# Patient Record
Sex: FEMALE | Race: WHITE | NOT HISPANIC OR LATINO | Employment: FULL TIME | URBAN - METROPOLITAN AREA
[De-identification: names, ages, dates, MRNs, and addresses within clinical notes are randomized per-mention and may not be internally consistent; named-entity substitution may affect disease eponyms.]

---

## 2017-06-06 ENCOUNTER — TRANSCRIBE ORDERS (OUTPATIENT)
Dept: LAB | Facility: CLINIC | Age: 57
End: 2017-06-06

## 2017-06-06 ENCOUNTER — APPOINTMENT (OUTPATIENT)
Dept: LAB | Facility: CLINIC | Age: 57
End: 2017-06-06
Payer: COMMERCIAL

## 2017-06-06 DIAGNOSIS — E89.0 POSTSURGICAL HYPOTHYROIDISM: Primary | ICD-10-CM

## 2017-06-06 LAB — VENIPUNCTURE: NORMAL

## 2017-06-06 PROCEDURE — 36415 COLL VENOUS BLD VENIPUNCTURE: CPT | Performed by: INTERNAL MEDICINE

## 2017-07-12 ENCOUNTER — APPOINTMENT (EMERGENCY)
Dept: RADIOLOGY | Facility: HOSPITAL | Age: 57
End: 2017-07-12
Payer: COMMERCIAL

## 2017-07-12 ENCOUNTER — HOSPITAL ENCOUNTER (EMERGENCY)
Facility: HOSPITAL | Age: 57
Discharge: OTHER INSTITUTION | End: 2017-07-13
Attending: EMERGENCY MEDICINE | Admitting: EMERGENCY MEDICINE
Payer: COMMERCIAL

## 2017-07-12 VITALS
HEART RATE: 97 BPM | WEIGHT: 139 LBS | RESPIRATION RATE: 18 BRPM | DIASTOLIC BLOOD PRESSURE: 64 MMHG | SYSTOLIC BLOOD PRESSURE: 110 MMHG | TEMPERATURE: 97.1 F | BODY MASS INDEX: 22.34 KG/M2 | OXYGEN SATURATION: 97 % | HEIGHT: 66 IN

## 2017-07-12 DIAGNOSIS — Z72.89 SELF-DESTRUCTIVE BEHAVIOR: ICD-10-CM

## 2017-07-12 DIAGNOSIS — F10.10 ALCOHOL ABUSE: Primary | ICD-10-CM

## 2017-07-12 LAB
AMPHETAMINES SERPL QL SCN: NEGATIVE
ANION GAP SERPL CALCULATED.3IONS-SCNC: 7 MMOL/L (ref 4–13)
BARBITURATES UR QL: NEGATIVE
BASOPHILS # BLD AUTO: 0.1 THOUSANDS/ΜL (ref 0–0.1)
BASOPHILS NFR BLD AUTO: 2 % (ref 0–1)
BENZODIAZ UR QL: NEGATIVE
BILIRUB UR QL STRIP: NEGATIVE
BUN SERPL-MCNC: 7 MG/DL (ref 5–25)
CALCIUM SERPL-MCNC: 8.8 MG/DL (ref 8.3–10.1)
CHLORIDE SERPL-SCNC: 106 MMOL/L (ref 100–108)
CLARITY UR: CLEAR
CO2 SERPL-SCNC: 32 MMOL/L (ref 21–32)
COCAINE UR QL: NEGATIVE
COLOR UR: NORMAL
CREAT SERPL-MCNC: 0.82 MG/DL (ref 0.6–1.3)
EOSINOPHIL # BLD AUTO: 0.1 THOUSAND/ΜL (ref 0–0.61)
EOSINOPHIL NFR BLD AUTO: 2 % (ref 0–6)
ERYTHROCYTE [DISTWIDTH] IN BLOOD BY AUTOMATED COUNT: 13.5 % (ref 11.6–15.1)
ETHANOL SERPL-MCNC: 200 MG/DL (ref 0–3)
GFR SERPL CREATININE-BSD FRML MDRD: >60 ML/MIN/1.73SQ M
GLUCOSE SERPL-MCNC: 96 MG/DL (ref 65–140)
GLUCOSE UR STRIP-MCNC: NEGATIVE MG/DL
HCG UR QL: NORMAL
HCT VFR BLD AUTO: 39.9 % (ref 37–47)
HGB BLD-MCNC: 13.7 G/DL (ref 12–16)
HGB UR QL STRIP.AUTO: NEGATIVE
KETONES UR STRIP-MCNC: NEGATIVE MG/DL
LEUKOCYTE ESTERASE UR QL STRIP: NEGATIVE
LYMPHOCYTES # BLD AUTO: 1.9 THOUSANDS/ΜL (ref 0.6–4.47)
LYMPHOCYTES NFR BLD AUTO: 48 % (ref 14–44)
MCH RBC QN AUTO: 34.5 PG (ref 27–31)
MCHC RBC AUTO-ENTMCNC: 34.4 G/DL (ref 31.4–37.4)
MCV RBC AUTO: 101 FL (ref 82–98)
METHADONE UR QL: NEGATIVE
MONOCYTES # BLD AUTO: 0.5 THOUSAND/ΜL (ref 0.17–1.22)
MONOCYTES NFR BLD AUTO: 12 % (ref 4–12)
NEUTROPHILS # BLD AUTO: 1.4 THOUSANDS/ΜL (ref 1.85–7.62)
NEUTS SEG NFR BLD AUTO: 36 % (ref 43–75)
NITRITE UR QL STRIP: NEGATIVE
NRBC BLD AUTO-RTO: 0 /100 WBCS
OPIATES UR QL SCN: NEGATIVE
PCP UR QL: NEGATIVE
PH UR STRIP.AUTO: 6 [PH] (ref 5–9)
PLATELET # BLD AUTO: 220 THOUSANDS/UL (ref 130–400)
PMV BLD AUTO: 8.1 FL (ref 8.9–12.7)
POTASSIUM SERPL-SCNC: 3.8 MMOL/L (ref 3.5–5.3)
PROT UR STRIP-MCNC: NEGATIVE MG/DL
RBC # BLD AUTO: 3.97 MILLION/UL (ref 4.2–5.4)
SODIUM SERPL-SCNC: 145 MMOL/L (ref 136–145)
SP GR UR STRIP.AUTO: <=1.005 (ref 1–1.03)
THC UR QL: NEGATIVE
TSH SERPL DL<=0.05 MIU/L-ACNC: 5.69 UIU/ML (ref 0.36–3.74)
UROBILINOGEN UR QL STRIP.AUTO: 0.2 E.U./DL
WBC # BLD AUTO: 3.8 THOUSAND/UL (ref 4.8–10.8)

## 2017-07-12 PROCEDURE — 71010 HB CHEST X-RAY 1 VIEW FRONTAL (PORTABLE): CPT

## 2017-07-12 PROCEDURE — 70450 CT HEAD/BRAIN W/O DYE: CPT

## 2017-07-12 PROCEDURE — 80307 DRUG TEST PRSMV CHEM ANLYZR: CPT | Performed by: EMERGENCY MEDICINE

## 2017-07-12 PROCEDURE — 93005 ELECTROCARDIOGRAM TRACING: CPT | Performed by: EMERGENCY MEDICINE

## 2017-07-12 PROCEDURE — 36415 COLL VENOUS BLD VENIPUNCTURE: CPT | Performed by: EMERGENCY MEDICINE

## 2017-07-12 PROCEDURE — 81025 URINE PREGNANCY TEST: CPT | Performed by: EMERGENCY MEDICINE

## 2017-07-12 PROCEDURE — 80048 BASIC METABOLIC PNL TOTAL CA: CPT | Performed by: EMERGENCY MEDICINE

## 2017-07-12 PROCEDURE — 85025 COMPLETE CBC W/AUTO DIFF WBC: CPT | Performed by: EMERGENCY MEDICINE

## 2017-07-12 PROCEDURE — 81003 URINALYSIS AUTO W/O SCOPE: CPT | Performed by: EMERGENCY MEDICINE

## 2017-07-12 PROCEDURE — 84443 ASSAY THYROID STIM HORMONE: CPT | Performed by: EMERGENCY MEDICINE

## 2017-07-12 PROCEDURE — 84439 ASSAY OF FREE THYROXINE: CPT | Performed by: EMERGENCY MEDICINE

## 2017-07-12 PROCEDURE — 80320 DRUG SCREEN QUANTALCOHOLS: CPT | Performed by: EMERGENCY MEDICINE

## 2017-07-12 RX ORDER — LORAZEPAM 1 MG/1
1 TABLET ORAL ONCE
Status: COMPLETED | OUTPATIENT
Start: 2017-07-12 | End: 2017-07-12

## 2017-07-12 RX ORDER — LEVOTHYROXINE SODIUM 0.1 MG/1
88 TABLET ORAL DAILY
Status: ON HOLD | COMMUNITY
End: 2018-10-21

## 2017-07-12 RX ADMIN — LORAZEPAM 1 MG: 1 TABLET ORAL at 21:49

## 2017-07-13 LAB — T4 FREE SERPL-MCNC: 1.04 NG/DL (ref 0.76–1.46)

## 2017-07-13 PROCEDURE — 99285 EMERGENCY DEPT VISIT HI MDM: CPT

## 2017-07-13 RX ORDER — LEVOTHYROXINE SODIUM 88 UG/1
88 TABLET ORAL
Status: DISCONTINUED | OUTPATIENT
Start: 2017-07-13 | End: 2017-07-13 | Stop reason: HOSPADM

## 2017-07-13 RX ORDER — LORAZEPAM 1 MG/1
1 TABLET ORAL EVERY 6 HOURS PRN
Status: DISCONTINUED | OUTPATIENT
Start: 2017-07-13 | End: 2017-07-13 | Stop reason: HOSPADM

## 2017-07-13 RX ORDER — LORAZEPAM 1 MG/1
1 TABLET ORAL EVERY 6 HOURS PRN
Status: DISCONTINUED | OUTPATIENT
Start: 2017-07-13 | End: 2017-07-13

## 2017-07-13 RX ADMIN — LORAZEPAM 1 MG: 1 TABLET ORAL at 09:59

## 2017-07-13 RX ADMIN — LEVOTHYROXINE SODIUM 88 MCG: 88 TABLET ORAL at 10:29

## 2017-07-14 LAB
ATRIAL RATE: 80 BPM
P AXIS: 73 DEGREES
PR INTERVAL: 156 MS
QRS AXIS: 39 DEGREES
QRSD INTERVAL: 78 MS
QT INTERVAL: 380 MS
QTC INTERVAL: 438 MS
T WAVE AXIS: 60 DEGREES
VENTRICULAR RATE: 80 BPM

## 2018-10-17 ENCOUNTER — APPOINTMENT (EMERGENCY)
Dept: RADIOLOGY | Facility: HOSPITAL | Age: 58
DRG: 683 | End: 2018-10-17
Payer: COMMERCIAL

## 2018-10-17 ENCOUNTER — HOSPITAL ENCOUNTER (INPATIENT)
Facility: HOSPITAL | Age: 58
LOS: 4 days | Discharge: HOME | DRG: 683 | End: 2018-10-21
Attending: EMERGENCY MEDICINE | Admitting: FAMILY MEDICINE
Payer: COMMERCIAL

## 2018-10-17 DIAGNOSIS — E03.9 HYPOTHYROIDISM, UNSPECIFIED TYPE: ICD-10-CM

## 2018-10-17 DIAGNOSIS — R79.89 ABNORMAL LFTS: ICD-10-CM

## 2018-10-17 DIAGNOSIS — F10.10 ALCOHOL ABUSE: ICD-10-CM

## 2018-10-17 DIAGNOSIS — N39.0 UTI (URINARY TRACT INFECTION): ICD-10-CM

## 2018-10-17 DIAGNOSIS — R44.3 HALLUCINATION: Primary | ICD-10-CM

## 2018-10-17 DIAGNOSIS — N17.9 AKI (ACUTE KIDNEY INJURY) (HCC): ICD-10-CM

## 2018-10-17 PROBLEM — E87.6 HYPOKALEMIA: Status: ACTIVE | Noted: 2018-10-17

## 2018-10-17 PROBLEM — D69.6 THROMBOCYTOPENIA (HCC): Status: ACTIVE | Noted: 2018-10-17

## 2018-10-17 PROBLEM — N30.01 ACUTE CYSTITIS WITH HEMATURIA: Status: ACTIVE | Noted: 2018-10-17

## 2018-10-17 PROBLEM — F10.239 ALCOHOL WITHDRAWAL (HCC): Status: ACTIVE | Noted: 2018-10-17

## 2018-10-17 PROBLEM — E87.1 HYPONATREMIA: Status: ACTIVE | Noted: 2018-10-17

## 2018-10-17 PROBLEM — F99 PSYCHIATRIC DISTURBANCE: Status: ACTIVE | Noted: 2018-10-17

## 2018-10-17 LAB
ALBUMIN SERPL BCP-MCNC: 4.7 G/DL (ref 3.5–5)
ALP SERPL-CCNC: 56 U/L (ref 46–116)
ALT SERPL W P-5'-P-CCNC: 579 U/L (ref 12–78)
AMPHETAMINES SERPL QL SCN: NEGATIVE
ANION GAP SERPL CALCULATED.3IONS-SCNC: 12 MMOL/L (ref 4–13)
APAP SERPL-MCNC: <2 UG/ML (ref 10–30)
AST SERPL W P-5'-P-CCNC: 845 U/L (ref 5–45)
BACTERIA UR QL AUTO: ABNORMAL /HPF
BARBITURATES UR QL: NEGATIVE
BASOPHILS # BLD AUTO: 0.07 THOUSANDS/ΜL (ref 0–0.1)
BASOPHILS NFR BLD AUTO: 1 % (ref 0–1)
BENZODIAZ UR QL: NEGATIVE
BILIRUB SERPL-MCNC: 1.2 MG/DL (ref 0.2–1)
BILIRUB UR QL STRIP: ABNORMAL
BUN SERPL-MCNC: 26 MG/DL (ref 5–25)
CALCIUM SERPL-MCNC: 9.1 MG/DL (ref 8.3–10.1)
CHLORIDE SERPL-SCNC: 92 MMOL/L (ref 100–108)
CLARITY UR: ABNORMAL
CO2 SERPL-SCNC: 29 MMOL/L (ref 21–32)
COCAINE UR QL: NEGATIVE
COLOR UR: YELLOW
CREAT SERPL-MCNC: 1.59 MG/DL (ref 0.6–1.3)
EOSINOPHIL # BLD AUTO: 0.09 THOUSAND/ΜL (ref 0–0.61)
EOSINOPHIL NFR BLD AUTO: 1 % (ref 0–6)
ERYTHROCYTE [DISTWIDTH] IN BLOOD BY AUTOMATED COUNT: 11.7 % (ref 11.6–15.1)
ETHANOL SERPL-MCNC: <3 MG/DL (ref 0–3)
EXT PREG TEST URINE: NEGATIVE
GFR SERPL CREATININE-BSD FRML MDRD: 36 ML/MIN/1.73SQ M
GLUCOSE SERPL-MCNC: 86 MG/DL (ref 65–140)
GLUCOSE UR STRIP-MCNC: NEGATIVE MG/DL
HCT VFR BLD AUTO: 38.2 % (ref 34.8–46.1)
HGB BLD-MCNC: 12.6 G/DL (ref 11.5–15.4)
HGB UR QL STRIP.AUTO: ABNORMAL
IMM GRANULOCYTES # BLD AUTO: 0.03 THOUSAND/UL (ref 0–0.2)
IMM GRANULOCYTES NFR BLD AUTO: 0 % (ref 0–2)
KETONES UR STRIP-MCNC: ABNORMAL MG/DL
LEUKOCYTE ESTERASE UR QL STRIP: ABNORMAL
LYMPHOCYTES # BLD AUTO: 1.64 THOUSANDS/ΜL (ref 0.6–4.47)
LYMPHOCYTES NFR BLD AUTO: 23 % (ref 14–44)
MCH RBC QN AUTO: 32.7 PG (ref 26.8–34.3)
MCHC RBC AUTO-ENTMCNC: 33 G/DL (ref 31.4–37.4)
MCV RBC AUTO: 99 FL (ref 82–98)
METHADONE UR QL: NEGATIVE
MONOCYTES # BLD AUTO: 0.74 THOUSAND/ΜL (ref 0.17–1.22)
MONOCYTES NFR BLD AUTO: 10 % (ref 4–12)
NEUTROPHILS # BLD AUTO: 4.67 THOUSANDS/ΜL (ref 1.85–7.62)
NEUTS SEG NFR BLD AUTO: 65 % (ref 43–75)
NITRITE UR QL STRIP: NEGATIVE
NON-SQ EPI CELLS URNS QL MICRO: ABNORMAL /HPF
NRBC BLD AUTO-RTO: 0 /100 WBCS
OPIATES UR QL SCN: NEGATIVE
PCP UR QL: NEGATIVE
PH UR STRIP.AUTO: 6 [PH] (ref 5–9)
PLATELET # BLD AUTO: 110 THOUSANDS/UL (ref 149–390)
PMV BLD AUTO: 11.3 FL (ref 8.9–12.7)
POTASSIUM SERPL-SCNC: 3.1 MMOL/L (ref 3.5–5.3)
PROT SERPL-MCNC: 8.4 G/DL (ref 6.4–8.2)
PROT UR STRIP-MCNC: ABNORMAL MG/DL
RBC # BLD AUTO: 3.85 MILLION/UL (ref 3.81–5.12)
RBC #/AREA URNS AUTO: ABNORMAL /HPF
SALICYLATES SERPL-MCNC: 6.3 MG/DL (ref 3–20)
SODIUM SERPL-SCNC: 133 MMOL/L (ref 136–145)
SP GR UR STRIP.AUTO: <=1.005 (ref 1–1.03)
THC UR QL: NEGATIVE
TSH SERPL DL<=0.05 MIU/L-ACNC: 16.9 UIU/ML (ref 0.36–3.74)
UROBILINOGEN UR QL STRIP.AUTO: 0.2 E.U./DL
WBC # BLD AUTO: 7.24 THOUSAND/UL (ref 4.31–10.16)
WBC #/AREA URNS AUTO: ABNORMAL /HPF

## 2018-10-17 PROCEDURE — 96361 HYDRATE IV INFUSION ADD-ON: CPT

## 2018-10-17 PROCEDURE — 84439 ASSAY OF FREE THYROXINE: CPT | Performed by: EMERGENCY MEDICINE

## 2018-10-17 PROCEDURE — 99285 EMERGENCY DEPT VISIT HI MDM: CPT

## 2018-10-17 PROCEDURE — 93005 ELECTROCARDIOGRAM TRACING: CPT

## 2018-10-17 PROCEDURE — 36415 COLL VENOUS BLD VENIPUNCTURE: CPT

## 2018-10-17 PROCEDURE — 94762 N-INVAS EAR/PLS OXIMTRY CONT: CPT

## 2018-10-17 PROCEDURE — 80307 DRUG TEST PRSMV CHEM ANLYZR: CPT

## 2018-10-17 PROCEDURE — 81025 URINE PREGNANCY TEST: CPT

## 2018-10-17 PROCEDURE — 84443 ASSAY THYROID STIM HORMONE: CPT | Performed by: EMERGENCY MEDICINE

## 2018-10-17 PROCEDURE — 96374 THER/PROPH/DIAG INJ IV PUSH: CPT

## 2018-10-17 PROCEDURE — 80329 ANALGESICS NON-OPIOID 1 OR 2: CPT | Performed by: FAMILY MEDICINE

## 2018-10-17 PROCEDURE — 71045 X-RAY EXAM CHEST 1 VIEW: CPT

## 2018-10-17 PROCEDURE — 70450 CT HEAD/BRAIN W/O DYE: CPT

## 2018-10-17 PROCEDURE — 80053 COMPREHEN METABOLIC PANEL: CPT

## 2018-10-17 PROCEDURE — 85025 COMPLETE CBC W/AUTO DIFF WBC: CPT

## 2018-10-17 PROCEDURE — 80320 DRUG SCREEN QUANTALCOHOLS: CPT

## 2018-10-17 PROCEDURE — 87086 URINE CULTURE/COLONY COUNT: CPT

## 2018-10-17 PROCEDURE — 99223 1ST HOSP IP/OBS HIGH 75: CPT | Performed by: FAMILY MEDICINE

## 2018-10-17 PROCEDURE — 81001 URINALYSIS AUTO W/SCOPE: CPT

## 2018-10-17 RX ORDER — ZOLPIDEM TARTRATE 10 MG/1
10 TABLET ORAL
COMMUNITY

## 2018-10-17 RX ORDER — CHLORDIAZEPOXIDE HYDROCHLORIDE 25 MG/1
50 CAPSULE, GELATIN COATED ORAL EVERY 8 HOURS SCHEDULED
Status: DISCONTINUED | OUTPATIENT
Start: 2018-10-17 | End: 2018-10-18

## 2018-10-17 RX ORDER — FOLIC ACID 1 MG/1
1 TABLET ORAL DAILY
Status: DISCONTINUED | OUTPATIENT
Start: 2018-10-17 | End: 2018-10-21 | Stop reason: HOSPADM

## 2018-10-17 RX ORDER — ONDANSETRON 2 MG/ML
4 INJECTION INTRAMUSCULAR; INTRAVENOUS EVERY 6 HOURS PRN
Status: DISCONTINUED | OUTPATIENT
Start: 2018-10-17 | End: 2018-10-21 | Stop reason: HOSPADM

## 2018-10-17 RX ORDER — LORAZEPAM 2 MG/ML
0.5 INJECTION INTRAMUSCULAR ONCE
Status: COMPLETED | OUTPATIENT
Start: 2018-10-17 | End: 2018-10-17

## 2018-10-17 RX ORDER — POTASSIUM CHLORIDE 20 MEQ/1
20 TABLET, EXTENDED RELEASE ORAL ONCE
Status: COMPLETED | OUTPATIENT
Start: 2018-10-17 | End: 2018-10-17

## 2018-10-17 RX ORDER — LORAZEPAM 2 MG/ML
1 INJECTION INTRAMUSCULAR EVERY 4 HOURS PRN
Status: DISCONTINUED | OUTPATIENT
Start: 2018-10-17 | End: 2018-10-21 | Stop reason: HOSPADM

## 2018-10-17 RX ORDER — THIAMINE MONONITRATE (VIT B1) 100 MG
100 TABLET ORAL DAILY
Status: DISCONTINUED | OUTPATIENT
Start: 2018-10-17 | End: 2018-10-21 | Stop reason: HOSPADM

## 2018-10-17 RX ORDER — CLONAZEPAM 1 MG/1
1 TABLET ORAL AS NEEDED
Status: DISCONTINUED | OUTPATIENT
Start: 2018-10-17 | End: 2018-10-17

## 2018-10-17 RX ORDER — CLONAZEPAM 1 MG/1
1 TABLET ORAL AS NEEDED
COMMUNITY

## 2018-10-17 RX ORDER — SODIUM CHLORIDE AND POTASSIUM CHLORIDE .9; .15 G/100ML; G/100ML
125 SOLUTION INTRAVENOUS CONTINUOUS
Status: DISCONTINUED | OUTPATIENT
Start: 2018-10-17 | End: 2018-10-21 | Stop reason: HOSPADM

## 2018-10-17 RX ORDER — LEVOTHYROXINE SODIUM 0.1 MG/1
100 TABLET ORAL
Status: DISCONTINUED | OUTPATIENT
Start: 2018-10-18 | End: 2018-10-18

## 2018-10-17 RX ORDER — LORAZEPAM 2 MG/ML
2 INJECTION INTRAMUSCULAR ONCE
Status: CANCELLED | OUTPATIENT
Start: 2018-10-18

## 2018-10-17 RX ADMIN — SODIUM CHLORIDE 1000 ML: 0.9 INJECTION, SOLUTION INTRAVENOUS at 19:44

## 2018-10-17 RX ADMIN — LORAZEPAM 0.5 MG: 2 INJECTION INTRAMUSCULAR; INTRAVENOUS at 20:36

## 2018-10-17 RX ADMIN — SODIUM CHLORIDE AND POTASSIUM CHLORIDE 125 ML/HR: .9; .15 SOLUTION INTRAVENOUS at 22:26

## 2018-10-17 RX ADMIN — Medication 100 MG: at 22:26

## 2018-10-17 RX ADMIN — CHLORDIAZEPOXIDE HYDROCHLORIDE 50 MG: 25 CAPSULE ORAL at 22:24

## 2018-10-17 RX ADMIN — CEFTRIAXONE 1000 MG: 1 INJECTION, SOLUTION INTRAVENOUS at 20:40

## 2018-10-17 RX ADMIN — FOLIC ACID 1 MG: 1 TABLET ORAL at 22:24

## 2018-10-17 RX ADMIN — POTASSIUM CHLORIDE 20 MEQ: 1500 TABLET, EXTENDED RELEASE ORAL at 20:36

## 2018-10-17 RX ADMIN — POTASSIUM CHLORIDE 20 MEQ: 1500 TABLET, EXTENDED RELEASE ORAL at 22:25

## 2018-10-17 RX ADMIN — Medication 1 TABLET: at 22:24

## 2018-10-17 NOTE — LETTER
[address] [facility] [address]  Dept: [phone]    October 21, 2018     Patient: Dean Kaye   YOB: 1960   Date of Visit: 10/17/2018       To Whom it May Concern: Dean Kaye is under my professional care  She was admitted to the hospital from 10/17/2018   to 10/21/18  Please excuse her from work during that time  If you have any questions or concerns, please don't hesitate to call           Sincerely,          Amy Herzog MD

## 2018-10-17 NOTE — ED NOTES
Watch, ring  Pants, bra, shirt, flip flops, purse, various makeup, zyrtec bottle with a different kind of pill in it, 2 phone chargers, eye drops, tin of mints, pen, hair clips, photo id, [employer] explorer card, hair brush, check book, fit bit, gum, wallet, $4, various gift cards, club cards and prayer cards       Emily Aguayo  10/17/18 6125
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
## 2018-10-18 ENCOUNTER — APPOINTMENT (INPATIENT)
Dept: RADIOLOGY | Facility: HOSPITAL | Age: 58
DRG: 683 | End: 2018-10-18
Payer: COMMERCIAL

## 2018-10-18 PROBLEM — F10.231 ALCOHOL WITHDRAWAL SYNDROME, WITH DELIRIUM (HCC): Status: ACTIVE | Noted: 2018-10-17

## 2018-10-18 PROBLEM — N17.9 AKI (ACUTE KIDNEY INJURY) (HCC): Status: RESOLVED | Noted: 2018-10-17 | Resolved: 2018-10-18

## 2018-10-18 PROBLEM — E87.6 HYPOKALEMIA: Status: RESOLVED | Noted: 2018-10-17 | Resolved: 2018-10-18

## 2018-10-18 PROBLEM — E87.1 HYPONATREMIA: Status: RESOLVED | Noted: 2018-10-17 | Resolved: 2018-10-18

## 2018-10-18 LAB
ALBUMIN SERPL BCP-MCNC: 4 G/DL (ref 3.5–5)
ALP SERPL-CCNC: 51 U/L (ref 46–116)
ALT SERPL W P-5'-P-CCNC: 723 U/L (ref 12–78)
ANION GAP SERPL CALCULATED.3IONS-SCNC: 15 MMOL/L (ref 4–13)
AST SERPL W P-5'-P-CCNC: 909 U/L (ref 5–45)
ATRIAL RATE: 96 BPM
BACTERIA UR CULT: NORMAL
BILIRUB SERPL-MCNC: 0.8 MG/DL (ref 0.2–1)
BUN SERPL-MCNC: 27 MG/DL (ref 5–25)
CALCIUM SERPL-MCNC: 8.7 MG/DL (ref 8.3–10.1)
CHLORIDE SERPL-SCNC: 100 MMOL/L (ref 100–108)
CO2 SERPL-SCNC: 23 MMOL/L (ref 21–32)
CREAT SERPL-MCNC: 1.18 MG/DL (ref 0.6–1.3)
ERYTHROCYTE [DISTWIDTH] IN BLOOD BY AUTOMATED COUNT: 11.7 % (ref 11.6–15.1)
GFR SERPL CREATININE-BSD FRML MDRD: 52 ML/MIN/1.73SQ M
GLUCOSE SERPL-MCNC: 87 MG/DL (ref 65–140)
HAV IGM SER QL: NORMAL
HBV CORE IGM SER QL: NORMAL
HBV SURFACE AG SER QL: NORMAL
HCT VFR BLD AUTO: 37.4 % (ref 34.8–46.1)
HCV AB SER QL: NORMAL
HGB BLD-MCNC: 12.1 G/DL (ref 11.5–15.4)
MAGNESIUM SERPL-MCNC: 1.6 MG/DL (ref 1.6–2.6)
MCH RBC QN AUTO: 32.6 PG (ref 26.8–34.3)
MCHC RBC AUTO-ENTMCNC: 32.4 G/DL (ref 31.4–37.4)
MCV RBC AUTO: 101 FL (ref 82–98)
P AXIS: 65 DEGREES
PLATELET # BLD AUTO: 116 THOUSANDS/UL (ref 149–390)
PMV BLD AUTO: 12.2 FL (ref 8.9–12.7)
POTASSIUM SERPL-SCNC: 4.1 MMOL/L (ref 3.5–5.3)
PR INTERVAL: 150 MS
PROT SERPL-MCNC: 7.6 G/DL (ref 6.4–8.2)
QRS AXIS: 49 DEGREES
QRSD INTERVAL: 84 MS
QT INTERVAL: 390 MS
QTC INTERVAL: 492 MS
RBC # BLD AUTO: 3.71 MILLION/UL (ref 3.81–5.12)
SODIUM SERPL-SCNC: 138 MMOL/L (ref 136–145)
T WAVE AXIS: 55 DEGREES
T4 FREE SERPL-MCNC: 1.08 NG/DL (ref 0.76–1.46)
TSH SERPL DL<=0.05 MIU/L-ACNC: 16.62 UIU/ML (ref 0.36–3.74)
VENTRICULAR RATE: 96 BPM
WBC # BLD AUTO: 5.49 THOUSAND/UL (ref 4.31–10.16)

## 2018-10-18 PROCEDURE — 80074 ACUTE HEPATITIS PANEL: CPT | Performed by: FAMILY MEDICINE

## 2018-10-18 PROCEDURE — 85027 COMPLETE CBC AUTOMATED: CPT | Performed by: FAMILY MEDICINE

## 2018-10-18 PROCEDURE — 99254 IP/OBS CNSLTJ NEW/EST MOD 60: CPT | Performed by: PSYCHIATRY & NEUROLOGY

## 2018-10-18 PROCEDURE — 94760 N-INVAS EAR/PLS OXIMETRY 1: CPT

## 2018-10-18 PROCEDURE — 99232 SBSQ HOSP IP/OBS MODERATE 35: CPT | Performed by: STUDENT IN AN ORGANIZED HEALTH CARE EDUCATION/TRAINING PROGRAM

## 2018-10-18 PROCEDURE — 93010 ELECTROCARDIOGRAM REPORT: CPT | Performed by: INTERNAL MEDICINE

## 2018-10-18 PROCEDURE — 94762 N-INVAS EAR/PLS OXIMTRY CONT: CPT

## 2018-10-18 PROCEDURE — 84443 ASSAY THYROID STIM HORMONE: CPT | Performed by: STUDENT IN AN ORGANIZED HEALTH CARE EDUCATION/TRAINING PROGRAM

## 2018-10-18 PROCEDURE — 87081 CULTURE SCREEN ONLY: CPT | Performed by: FAMILY MEDICINE

## 2018-10-18 PROCEDURE — 80053 COMPREHEN METABOLIC PANEL: CPT | Performed by: FAMILY MEDICINE

## 2018-10-18 PROCEDURE — 76705 ECHO EXAM OF ABDOMEN: CPT

## 2018-10-18 PROCEDURE — 83735 ASSAY OF MAGNESIUM: CPT | Performed by: FAMILY MEDICINE

## 2018-10-18 RX ORDER — LORAZEPAM 1 MG/1
2 TABLET ORAL ONCE
Status: COMPLETED | OUTPATIENT
Start: 2018-10-18 | End: 2018-10-18

## 2018-10-18 RX ORDER — LEVOTHYROXINE SODIUM 0.12 MG/1
125 TABLET ORAL
Status: DISCONTINUED | OUTPATIENT
Start: 2018-10-19 | End: 2018-10-21 | Stop reason: HOSPADM

## 2018-10-18 RX ORDER — LORAZEPAM 2 MG/ML
2 INJECTION INTRAMUSCULAR ONCE
Status: COMPLETED | OUTPATIENT
Start: 2018-10-18 | End: 2018-10-18

## 2018-10-18 RX ORDER — LORAZEPAM 2 MG/ML
4 INJECTION INTRAMUSCULAR ONCE
Status: DISCONTINUED | OUTPATIENT
Start: 2018-10-18 | End: 2018-10-18

## 2018-10-18 RX ORDER — CHLORDIAZEPOXIDE HYDROCHLORIDE 25 MG/1
50 CAPSULE, GELATIN COATED ORAL EVERY 6 HOURS SCHEDULED
Status: COMPLETED | OUTPATIENT
Start: 2018-10-19 | End: 2018-10-19

## 2018-10-18 RX ORDER — LORAZEPAM 2 MG/ML
4 INJECTION INTRAMUSCULAR ONCE
Status: COMPLETED | OUTPATIENT
Start: 2018-10-18 | End: 2018-10-18

## 2018-10-18 RX ORDER — LORAZEPAM 1 MG/1
2 TABLET ORAL ONCE
Status: DISCONTINUED | OUTPATIENT
Start: 2018-10-18 | End: 2018-10-18

## 2018-10-18 RX ORDER — DIAZEPAM 5 MG/ML
2.5 INJECTION, SOLUTION INTRAMUSCULAR; INTRAVENOUS ONCE
Status: COMPLETED | OUTPATIENT
Start: 2018-10-18 | End: 2018-10-18

## 2018-10-18 RX ORDER — CHLORDIAZEPOXIDE HYDROCHLORIDE 25 MG/1
50 CAPSULE, GELATIN COATED ORAL EVERY 4 HOURS
Status: COMPLETED | OUTPATIENT
Start: 2018-10-18 | End: 2018-10-19

## 2018-10-18 RX ORDER — CHLORDIAZEPOXIDE HYDROCHLORIDE 25 MG/1
25 CAPSULE, GELATIN COATED ORAL EVERY 6 HOURS SCHEDULED
Status: DISCONTINUED | OUTPATIENT
Start: 2018-10-21 | End: 2018-10-20

## 2018-10-18 RX ORDER — CHLORDIAZEPOXIDE HYDROCHLORIDE 25 MG/1
25 CAPSULE, GELATIN COATED ORAL EVERY 4 HOURS
Status: DISCONTINUED | OUTPATIENT
Start: 2018-10-20 | End: 2018-10-20

## 2018-10-18 RX ADMIN — Medication 2.5 MG: at 06:31

## 2018-10-18 RX ADMIN — LORAZEPAM 2 MG: 2 INJECTION INTRAMUSCULAR; INTRAVENOUS at 06:15

## 2018-10-18 RX ADMIN — CEFTRIAXONE 1000 MG: 1 INJECTION, SOLUTION INTRAVENOUS at 21:12

## 2018-10-18 RX ADMIN — ENOXAPARIN SODIUM 40 MG: 40 INJECTION SUBCUTANEOUS at 08:49

## 2018-10-18 RX ADMIN — CHLORDIAZEPOXIDE HYDROCHLORIDE 50 MG: 25 CAPSULE ORAL at 17:28

## 2018-10-18 RX ADMIN — LORAZEPAM 1 MG: 2 INJECTION INTRAMUSCULAR; INTRAVENOUS at 03:05

## 2018-10-18 RX ADMIN — LORAZEPAM 2 MG: 1 TABLET ORAL at 00:18

## 2018-10-18 RX ADMIN — LORAZEPAM 2 MG: 1 TABLET ORAL at 12:36

## 2018-10-18 RX ADMIN — CHLORDIAZEPOXIDE HYDROCHLORIDE 50 MG: 25 CAPSULE ORAL at 05:16

## 2018-10-18 RX ADMIN — LORAZEPAM 2 MG: 2 INJECTION INTRAMUSCULAR; INTRAVENOUS at 15:08

## 2018-10-18 RX ADMIN — CHLORDIAZEPOXIDE HYDROCHLORIDE 50 MG: 25 CAPSULE ORAL at 21:12

## 2018-10-18 RX ADMIN — LORAZEPAM 4 MG: 2 INJECTION INTRAMUSCULAR; INTRAVENOUS at 08:22

## 2018-10-18 RX ADMIN — LEVOTHYROXINE SODIUM 100 MCG: 100 TABLET ORAL at 05:17

## 2018-10-18 RX ADMIN — Medication 1 TABLET: at 08:48

## 2018-10-18 RX ADMIN — FOLIC ACID 1 MG: 1 TABLET ORAL at 08:48

## 2018-10-18 RX ADMIN — SODIUM CHLORIDE AND POTASSIUM CHLORIDE 125 ML/HR: .9; .15 SOLUTION INTRAVENOUS at 14:38

## 2018-10-18 RX ADMIN — Medication 100 MG: at 08:48

## 2018-10-18 RX ADMIN — SODIUM CHLORIDE AND POTASSIUM CHLORIDE 125 ML/HR: .9; .15 SOLUTION INTRAVENOUS at 23:06

## 2018-10-18 RX ADMIN — SODIUM CHLORIDE AND POTASSIUM CHLORIDE 125 ML/HR: .9; .15 SOLUTION INTRAVENOUS at 06:16

## 2018-10-18 RX ADMIN — LORAZEPAM 2 MG: 2 INJECTION INTRAMUSCULAR; INTRAVENOUS at 10:42

## 2018-10-18 RX ADMIN — CHLORDIAZEPOXIDE HYDROCHLORIDE 50 MG: 25 CAPSULE ORAL at 12:35

## 2018-10-18 RX ADMIN — CHLORDIAZEPOXIDE HYDROCHLORIDE 50 MG: 25 CAPSULE ORAL at 08:48
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
## 2018-10-18 NOTE — ED NOTES
Helio Orr / Irving Boudreaux called - pt's BF reported they were drinking a good bit and went cold turkey about 1 week ago - pt took off work last week due to vomiting (etoh withdrawal?)  ER attending was advised; pt will be medically admitted for withdrawal; Helio Orr / Irving Boudreaux notified

## 2018-10-18 NOTE — ASSESSMENT & PLAN NOTE
· Alcohol withdrawal may be playing a role in this  Patient most likely also has some underlying psychiatric issues  · Place patient on one-to-one observation    Psychiatry consult  · CT head negative for acute intracranial abnormality  · Patient denies any suicidal, homicidal ideation or having any plan in place  · Monitor for any worsening of symptoms  · Will need crisis evaluation prior to discharge

## 2018-10-18 NOTE — ASSESSMENT & PLAN NOTE
· Patient in DTs from alcohol withdrawal  Having hallucinations and is not oriented     · CT head with no acute findings  · UDS negative  · EtOh on admission <3  · On librium and CIWA protocol  · Cont  IVF, multivitamin, thiamine, folic acid  · Monitor and replete electrolytes as needed  · Nonviolent restraints, continuous 1:1 obs as patient is not oriented and is unable to follow commands and interrupting care

## 2018-10-18 NOTE — ASSESSMENT & PLAN NOTE
· No prior lab work available for comparison  May have alcoholic hepatitis  · Tylenol and salicylate level are unremarkable     · UDS negative  · hepatitis panel negative  · RUQ US Echogenic liver most consistent with hepatic steatosis  · IVF hydrating, LFTs peaked and downtrended with IVF  · She was instructed to get blood work to continue to monitor in 1 week and to follow up with her PCP and abstain from EtOH
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
## 2018-10-18 NOTE — PLAN OF CARE
Pt given Ativan 2 mg for CIWA higher that 14  Called hospitalist for one dose of Valium IV 2 5 mg  Pt talking to  and sitter  Tremors notice  Will continued to follow CIWA protocol

## 2018-10-18 NOTE — ASSESSMENT & PLAN NOTE
· Received IV rocephin, 3 days  · UA with blood, leukocytes  · Urine culture with mixed contaminants

## 2018-10-18 NOTE — ASSESSMENT & PLAN NOTE
· Patient's TSH level noted to be elevated at 16 896  · Her Synthroid dose was increased from 100 to 125 mcg daily  · She will need repeat TSH level checked in 4-6 weeks as outpatient

## 2018-10-18 NOTE — PLAN OF CARE
DISCHARGE PLANNING     Discharge to home or other facility with appropriate resources Progressing        INFECTION - ADULT     Absence or prevention of progression during hospitalization Progressing        PAIN - ADULT     Verbalizes/displays adequate comfort level or baseline comfort level Progressing        Potential for Falls     Patient will remain free of falls Progressing        RESPIRATORY - ADULT     Achieves optimal ventilation and oxygenation Progressing        SAFETY,RESTRAINT: NV/NON-SELF DESTRUCTIVE BEHAVIOR     Remains free of harm/injury (restraint for non violent/non self-detsructive behavior) Progressing     Returns to optimal restraint-free functioning Progressing

## 2018-10-18 NOTE — ASSESSMENT & PLAN NOTE
· No prior lab work available for comparison  May have alcoholic hepatitis  · Tylenol and salicylate level are not elevated  · hepatitis panel negative  · RUQ US pending  · IVF hydrating, LFTs still rising  Tbili and alkphos wnl
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
## 2018-10-18 NOTE — ASSESSMENT & PLAN NOTE
· Patient admitted in DTs from alcohol withdrawal with hallucinations/delirium  · CT head with no acute findings  · UDS negative  · EtOh on admission <3  · Cont CIWA protocol  · Cont  IVF, multivitamin, thiamine, folic acid  · Monitor and replete electrolytes as needed  · Initially required Nonviolent restraints, continuous 1:1 obs as she was disoriented and unable to follow commands and interrupting care  Now stable  · Ambien PRN sleep  · Cont supportive care  · Completed librium protocol and was discharged home with resources for outpatient rehab  Patient does not want inpatient rehab

## 2018-10-18 NOTE — PLAN OF CARE
DISCHARGE PLANNING     Discharge to home or other facility with appropriate resources Progressing        INFECTION - ADULT     Absence or prevention of progression during hospitalization Progressing        PAIN - ADULT     Verbalizes/displays adequate comfort level or baseline comfort level Progressing        Potential for Falls     Patient will remain free of falls Progressing        RESPIRATORY - ADULT     Achieves optimal ventilation and oxygenation Progressing

## 2018-10-18 NOTE — RESPIRATORY THERAPY NOTE
Continuous pulse oximetry checked and replaced throughout shift  Patient bites on probe to remove  1:1 in room  I spoke with Dr Boyd Marshall who stated she will discontinue this device  We will leave continuous pulse oximeter in room for patient at this time

## 2018-10-18 NOTE — PROGRESS NOTES
Progress Note - Amanuel Ar 1960, 66 y o  female MRN: [id number]    Unit/Bed#: [address] [place] [address] Encounter: [id number]    Primary Care Provider: Donley Cabot, MD   Date and time admitted to hospital: 10/17/2018  6:08 PM        * Alcohol withdrawal syndrome, with delirium [facility])   Assessment & Plan    · Patient in DTs from alcohol withdrawal  Having hallucinations and is not oriented  · CT head with no acute findings  · UDS negative  · EtOh on admission <3  · On librium and CIWA protocol  · Cont  IVF, multivitamin, thiamine, folic acid  · Monitor and replete electrolytes as needed  · Initially required Nonviolent restraints, continuous 1:1 obs as she was disoriented and unable to follow commands and interrupting care  Now stable     Thrombocytopenia ([address] )   Assessment & Plan    Likely due to alcohol abuse  stable     Abnormal LFTs   Assessment & Plan    · No prior lab work available for comparison  May have alcoholic hepatitis  · Tylenol and salicylate level are unremarkable  · hepatitis panel negative  · RUQ US Echogenic liver most consistent with hepatic steatosis  · IVF hydrating, LFTs have peaked and are downtrending  · cont to monitor with serial labs  Hypothyroidism   Assessment & Plan    · Patient's TSH level noted to be elevated at 16 896  · Her Synthroid dose was increased from 100 to 125 mcg daily  · She will need repeat TSH level checked in 4-6 weeks as outpatient       Acute cystitis with hematuria-resolved as of 10/19/2018   Assessment & Plan    · Received IV rocephin, 3 days  · UA with blood, leukocytes  · Urine culture with mixed contaminants           VTE Pharmacologic Prophylaxis:   Pharmacologic: none  Mechanical VTE Prophylaxis in Place: Yes    Patient Centered Rounds: I have performed bedside rounds with nursing staff today  Discussions with Specialists or Other Care Team Provider: Yes    Education and Discussions with Family / Patient:Yes    Time Spent for Care: 30 minutes  More than 50% of total time spent on counseling and coordination of care as described above  Current Length of Stay: 2 day(s)    Current Patient Status: Inpatient     Discharge Plan: pending    Code Status: Level 1 - Full Code      Subjective:   Feels very shaky and anxious  Denies abdominal pain or N/V  Has not had a BM in several days  Objective:       Vitals:   Temp (24hrs), Av 9 °F (36 6 °C), Min:97 7 °F (36 5 °C), Max:98 1 °F (36 7 °C)    Temp:  [97 7 °F (36 5 °C)-98 1 °F (36 7 °C)] 97 9 °F (36 6 °C)  HR:  [60-86] 60  Resp:  [18] 18  BP: (115-156)/(58-86) 143/84  SpO2:  [95 %-96 %] 96 %  Body mass index is 24 21 kg/m²  Input and Output Summary (last 24 hours): Intake/Output Summary (Last 24 hours) at 10/19/18 1143  Last data filed at 10/19/18 1130   Gross per 24 hour   Intake          4633 33 ml   Output                0 ml   Net          4633 33 ml       Physical Exam:     Physical Exam   Constitutional: She is oriented to person, place, and time  She appears well-developed  No distress  HENT:   Head: Normocephalic and atraumatic  Cardiovascular: Normal rate, regular rhythm and normal heart sounds  Pulmonary/Chest: Effort normal and breath sounds normal  No respiratory distress  She has no wheezes  She has no rales  Abdominal: Soft  Bowel sounds are normal  She exhibits no distension  There is no tenderness  There is no rebound and no guarding  Musculoskeletal: She exhibits no edema, tenderness or deformity  Neurological: She is alert and oriented to person, place, and time  + course tremors   Skin: Skin is warm and dry  She is not diaphoretic  Nursing note and vitals reviewed        Additional Data:     Labs:      Results from last 7 days  Lab Units 10/19/18  0423  10/17/18  1835   WBC Thousand/uL 3 70*  < > 7 24   HEMOGLOBIN g/dL 12 1  < > 12 6   HEMATOCRIT % 38 6  < > 38 2   PLATELETS Thousands/uL 120*  < > 110*   NEUTROS PCT %  --   --  65   LYMPHS PCT %  --   -- 23   MONOS PCT %  --   --  10   EOS PCT %  --   --  1   < > = values in this interval not displayed  Results from last 7 days  Lab Units 10/19/18  0423   SODIUM mmol/L 137   POTASSIUM mmol/L 4 0   CHLORIDE mmol/L 102   CO2 mmol/L 23   BUN mg/dL 13   CREATININE mg/dL 0 82   CALCIUM mg/dL 8 4   ALK PHOS U/L 53   ALT U/L 697*   AST U/L 585*           * I Have Reviewed All Lab Data Listed Above  * Additional Pertinent Lab Tests Reviewed: All Labs For Current Hospital Admission Reviewed    Imaging:  Xr Chest 1 View Portable    Result Date: 10/18/2018  Narrative: CHEST INDICATION:   medical clearance  COMPARISON:  7/12/2017 EXAM PERFORMED/VIEWS:  XR CHEST PORTABLE Images: 1 FINDINGS: Cardiomediastinal silhouette appears unremarkable  Lungs are mildly hyperinflated but clear with no lobar consolidation, interstitial edema, or pleural effusions  No pneumothorax  Curvilinear calcification projecting over the left chest likely represents calcified breast implant  A healed fracture deformity of the right proximal humerus is stable as is suspected osteonecrosis of the head  Impression: No acute cardiopulmonary disease  Workstation performed: [id number]     Ct Head Without Contrast    Result Date: 10/17/2018  Narrative: CT BRAIN - WITHOUT CONTRAST INDICATION:   altered, hallucinations  COMPARISON:  None  TECHNIQUE:  CT examination of the brain was performed  In addition to axial images, coronal 2D reformatted images were created and submitted for interpretation  Radiation dose length product (DLP) for this visit:  1116 44 mGy-cm   This examination, like all CT scans performed in the [facility], was performed utilizing techniques to minimize radiation dose exposure, including the use of iterative reconstruction and automated exposure control  IMAGE QUALITY:  Diagnostic  FINDINGS: PARENCHYMA:  No intracranial mass, mass effect or midline shift  No CT signs of acute infarction    No acute parenchymal hemorrhage  VENTRICLES AND EXTRA-AXIAL SPACES:  Normal for the patient's age  VISUALIZED ORBITS AND PARANASAL SINUSES:  Unremarkable  CALVARIUM AND EXTRACRANIAL SOFT TISSUES:  Normal      Impression: No acute intracranial abnormality  Workstation performed: [id number]     Imaging Reports Reviewed by myself    Cultures:   Blood Culture: No results found for: BLOODCX  Urine Culture:   Lab Results   Component Value Date    URINECX 30,000-39,000 cfu/ml  10/17/2018     Sputum Culture: No components found for: SPUTUMCX  Wound Culture: No results found for: WOUNDCULT    Last 24 Hours Medication List:     Current Facility-Administered Medications:  chlordiazePOXIDE 50 mg Oral Q6H 3630 Ilan Neal MD    Followed by        Harley Daugherty ON 10/20/2018] chlordiazePOXIDE 25 mg Oral Q4H Ludin Fuller MD    Followed by        Harley Daugherty ON 10/21/2018] chlordiazePOXIDE 25 mg Oral Q6H 3630 Ilan Neal MD    enoxaparin 40 mg Subcutaneous Daily Keely Revankar, DO    folic acid 1 mg Oral Daily Keely Revankar, DO    influenza vaccine 0 5 mL Intramuscular Prior to discharge Nemo Ramirez, DO    levothyroxine 125 mcg Oral Early Morning Ludin Fuller MD    LORazepam 1 mg Intravenous Q4H PRN Keely Revankar, DO    multivitamin-minerals 1 tablet Oral Daily Keely Revankar, DO    ondansetron 4 mg Intravenous Q6H PRN Keely Revankar, DO    pneumococcal 23-valent polysaccharide vaccine 0 5 mL Subcutaneous Prior to discharge Keely Revankar, DO    polyethylene glycol 17 g Oral Once Ludin Fuller MD    sodium chloride 0 9 % with KCl 20 mEq/L 125 mL/hr Intravenous Continuous Keely Revankar, DO Last Rate: 125 mL/hr (10/19/18 1130)   thiamine 100 mg Oral Daily Keely Revankar, DO         Today, Patient Was Seen By: Ludin Fuller MD    ** Please Note: Dragon 360 Dictation voice to text software may have been used in the creation of this document   **
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
## 2018-10-19 PROBLEM — N30.01 ACUTE CYSTITIS WITH HEMATURIA: Status: RESOLVED | Noted: 2018-10-17 | Resolved: 2018-10-19

## 2018-10-19 LAB
ALBUMIN SERPL BCP-MCNC: 3.7 G/DL (ref 3.5–5)
ALP SERPL-CCNC: 53 U/L (ref 46–116)
ALT SERPL W P-5'-P-CCNC: 697 U/L (ref 12–78)
ANION GAP SERPL CALCULATED.3IONS-SCNC: 12 MMOL/L (ref 4–13)
AST SERPL W P-5'-P-CCNC: 585 U/L (ref 5–45)
BILIRUB SERPL-MCNC: 0.8 MG/DL (ref 0.2–1)
BUN SERPL-MCNC: 13 MG/DL (ref 5–25)
CALCIUM SERPL-MCNC: 8.4 MG/DL (ref 8.3–10.1)
CHLORIDE SERPL-SCNC: 102 MMOL/L (ref 100–108)
CO2 SERPL-SCNC: 23 MMOL/L (ref 21–32)
CREAT SERPL-MCNC: 0.82 MG/DL (ref 0.6–1.3)
ERYTHROCYTE [DISTWIDTH] IN BLOOD BY AUTOMATED COUNT: 11.6 % (ref 11.6–15.1)
GFR SERPL CREATININE-BSD FRML MDRD: 80 ML/MIN/1.73SQ M
GLUCOSE SERPL-MCNC: 82 MG/DL (ref 65–140)
HCT VFR BLD AUTO: 38.6 % (ref 34.8–46.1)
HGB BLD-MCNC: 12.1 G/DL (ref 11.5–15.4)
MAGNESIUM SERPL-MCNC: 1.7 MG/DL (ref 1.6–2.6)
MCH RBC QN AUTO: 32.4 PG (ref 26.8–34.3)
MCHC RBC AUTO-ENTMCNC: 31.3 G/DL (ref 31.4–37.4)
MCV RBC AUTO: 104 FL (ref 82–98)
MRSA NOSE QL CULT: NORMAL
PLATELET # BLD AUTO: 120 THOUSANDS/UL (ref 149–390)
PMV BLD AUTO: 11.8 FL (ref 8.9–12.7)
POTASSIUM SERPL-SCNC: 4 MMOL/L (ref 3.5–5.3)
PROT SERPL-MCNC: 7.3 G/DL (ref 6.4–8.2)
RBC # BLD AUTO: 3.73 MILLION/UL (ref 3.81–5.12)
SODIUM SERPL-SCNC: 137 MMOL/L (ref 136–145)
WBC # BLD AUTO: 3.7 THOUSAND/UL (ref 4.31–10.16)

## 2018-10-19 PROCEDURE — 99232 SBSQ HOSP IP/OBS MODERATE 35: CPT | Performed by: STUDENT IN AN ORGANIZED HEALTH CARE EDUCATION/TRAINING PROGRAM

## 2018-10-19 PROCEDURE — 94762 N-INVAS EAR/PLS OXIMTRY CONT: CPT

## 2018-10-19 PROCEDURE — 99231 SBSQ HOSP IP/OBS SF/LOW 25: CPT | Performed by: PSYCHIATRY & NEUROLOGY

## 2018-10-19 PROCEDURE — 83735 ASSAY OF MAGNESIUM: CPT | Performed by: STUDENT IN AN ORGANIZED HEALTH CARE EDUCATION/TRAINING PROGRAM

## 2018-10-19 PROCEDURE — 80053 COMPREHEN METABOLIC PANEL: CPT | Performed by: STUDENT IN AN ORGANIZED HEALTH CARE EDUCATION/TRAINING PROGRAM

## 2018-10-19 PROCEDURE — 90732 PPSV23 VACC 2 YRS+ SUBQ/IM: CPT | Performed by: FAMILY MEDICINE

## 2018-10-19 PROCEDURE — 85027 COMPLETE CBC AUTOMATED: CPT | Performed by: STUDENT IN AN ORGANIZED HEALTH CARE EDUCATION/TRAINING PROGRAM

## 2018-10-19 RX ORDER — MAGNESIUM CARB/ALUMINUM HYDROX 105-160MG
148 TABLET,CHEWABLE ORAL ONCE
Status: DISCONTINUED | OUTPATIENT
Start: 2018-10-19 | End: 2018-10-21 | Stop reason: HOSPADM

## 2018-10-19 RX ORDER — ZOLPIDEM TARTRATE 5 MG/1
5 TABLET ORAL
Status: DISCONTINUED | OUTPATIENT
Start: 2018-10-19 | End: 2018-10-21 | Stop reason: HOSPADM

## 2018-10-19 RX ORDER — POLYETHYLENE GLYCOL 3350 17 G/17G
17 POWDER, FOR SOLUTION ORAL ONCE
Status: DISCONTINUED | OUTPATIENT
Start: 2018-10-19 | End: 2018-10-19

## 2018-10-19 RX ADMIN — FOLIC ACID 1 MG: 1 TABLET ORAL at 08:31

## 2018-10-19 RX ADMIN — SODIUM CHLORIDE AND POTASSIUM CHLORIDE 125 ML/HR: .9; .15 SOLUTION INTRAVENOUS at 11:30

## 2018-10-19 RX ADMIN — CHLORDIAZEPOXIDE HYDROCHLORIDE 50 MG: 25 CAPSULE ORAL at 11:29

## 2018-10-19 RX ADMIN — Medication 100 MG: at 08:32

## 2018-10-19 RX ADMIN — CHLORDIAZEPOXIDE HYDROCHLORIDE 50 MG: 25 CAPSULE ORAL at 00:01

## 2018-10-19 RX ADMIN — ENOXAPARIN SODIUM 40 MG: 40 INJECTION SUBCUTANEOUS at 08:31

## 2018-10-19 RX ADMIN — CHLORDIAZEPOXIDE HYDROCHLORIDE 50 MG: 25 CAPSULE ORAL at 18:04

## 2018-10-19 RX ADMIN — PNEUMOCOCCAL VACCINE POLYVALENT 0.5 ML
25; 25; 25; 25; 25; 25; 25; 25; 25; 25; 25; 25; 25; 25; 25; 25; 25; 25; 25; 25; 25; 25; 25 INJECTION, SOLUTION INTRAMUSCULAR; SUBCUTANEOUS at 23:58

## 2018-10-19 RX ADMIN — SODIUM CHLORIDE AND POTASSIUM CHLORIDE 125 ML/HR: .9; .15 SOLUTION INTRAVENOUS at 20:05

## 2018-10-19 RX ADMIN — LEVOTHYROXINE SODIUM 125 MCG: 125 TABLET ORAL at 06:09

## 2018-10-19 RX ADMIN — CHLORDIAZEPOXIDE HYDROCHLORIDE 50 MG: 25 CAPSULE ORAL at 23:55

## 2018-10-19 RX ADMIN — CHLORDIAZEPOXIDE HYDROCHLORIDE 50 MG: 25 CAPSULE ORAL at 08:31

## 2018-10-19 RX ADMIN — Medication 1 TABLET: at 08:32

## 2018-10-19 RX ADMIN — BISACODYL 5 MG: 5 TABLET, COATED ORAL at 22:23

## 2018-10-19 RX ADMIN — CHLORDIAZEPOXIDE HYDROCHLORIDE 50 MG: 25 CAPSULE ORAL at 04:22

## 2018-10-19 RX ADMIN — ZOLPIDEM TARTRATE 5 MG: 5 TABLET, FILM COATED ORAL at 23:56
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
## 2018-10-19 NOTE — UTILIZATION REVIEW
Continued Stay Review    Date: 10/18/18    Vital Signs: /84 (BP Location: Right arm)   Pulse 60   Temp 97 9 °F (36 6 °C) (Tympanic)   Resp 18   Ht 5' 6" (1 676 m)   Wt 68 kg (150 lb)   SpO2 96%   BMI 24 21 kg/m²     1:1 OBSERVATION  RESTRAINTS  ATIVAN 2MG   IV ATIVAN X3 DOSES   IV VALIUM 2 5 MG   IM ATIVAN 2MG   CBC CMP MG   Medications:   Scheduled Meds:   Current Facility-Administered Medications:  chlordiazePOXIDE 50 mg Oral Q6H 3630 Ilan Neal MD    Followed by        Elyse Dacosta ON 10/20/2018] chlordiazePOXIDE 25 mg Oral Q4H Zachary Davison MD    Followed by        Elyse Dacosta ON 10/21/2018] chlordiazePOXIDE 25 mg Oral Q6H 3630 Ilan Neal MD    enoxaparin 40 mg Subcutaneous Daily Keely Revlaryar, DO    folic acid 1 mg Oral Daily Keely Robbins, DO    influenza vaccine 0 5 mL Intramuscular Prior to discharge Keely Robbins, DO    levothyroxine 125 mcg Oral Early Morning Zachary Davison MD    LORazepam 1 mg Intravenous Q4H PRN Keely Robbins, DO    multivitamin-minerals 1 tablet Oral Daily Keely Revlaryar, DO    ondansetron 4 mg Intravenous Q6H PRN Keely Revnazanin, DO    pneumococcal 23-valent polysaccharide vaccine 0 5 mL Subcutaneous Prior to discharge Keely Robbins, DO    polyethylene glycol 17 g Oral Once Zachary Davison MD    sodium chloride 0 9 % with KCl 20 mEq/L 125 mL/hr Intravenous Continuous Keely Revankar, DO Last Rate: 125 mL/hr (10/18/18 2306)   thiamine 100 mg Oral Daily Keely Revankar, DO      Continuous Infusions:   sodium chloride 0 9 % with KCl 20 mEq/L 125 mL/hr Last Rate: 125 mL/hr (10/18/18 2306)     PRN Meds: influenza vaccine    LORazepam    ondansetron    pneumococcal 23-valent polysaccharide vaccine    Abnormal Labs/Diagnostic Results: ANION GAP 15 BUN 27      RUQ US  Echogenic liver most consistent with hepatic steatosis  Please note more advanced forms of liver disease including hepatic fibrosis/early cirrhosis can not be excluded via ultrasound  Age/Sex: 66 y o  female     PSYCHIATRY     Assessment:  Patient is a 66 y o  female presents with Severe agitation and signs/symptoms of withdrawal from substance use  Patient appears delirious from alcohol withdrawal, stating delusions regarding people stealing her jewelery  Patient's behaviors include agitation, tremulous, trying to get out of bed  Relevant medical issues include heavy alcohol use (probable cause of elevated LFTs), UTI which could also be worsening delirium  Plan:   1  Alcohol withdrawal - Continue Librium taper with Ativan PRN for breakthrough withdrawal and/or agitation from delirium (already ordered)  Because agitation most likely due to withdrawal if still agitated even after30 min  PRN Ativan dose given, give more doses of Ativan until patient calm/sedated  Avoid Haldol as may mask ongoing withdrawal  2  Non-pharm interventions for delirium - frequent re-orientation by staff, lights on/curtains open during day, dark environment at night  3  Cont tx of UTI  4  Will re-assess delirium tomorrow     ATTENDING  * Alcohol withdrawal syndrome, with delirium ([facility] [address] )   Assessment & Plan     · Patient in DTs from alcohol withdrawal  Having hallucinations and is not oriented  · CT head with no acute findings  · UDS negative  · EtOh on admission <3  · On librium and CIWA protocol  · Cont  IVF, multivitamin, thiamine, folic acid  · Monitor and replete electrolytes as needed  · Nonviolent restraints, continuous 1:1 obs as patient is not oriented and is unable to follow commands and interrupting care   Thrombocytopenia [facility])   Assessment & Plan     Likely due to alcohol abuse  stable   Abnormal LFTs   Assessment & Plan     · No prior lab work available for comparison  May have alcoholic hepatitis  · Tylenol and salicylate level are not elevated  · hepatitis panel negative  · RUQ US pending  · IVF hydrating, LFTs still rising  Tbili and alkphos wnl      Acute cystitis with hematuria   Assessment & Plan     · On IV rocephin, day 2  · UA with blood, leukocytes  · Urine culture pending   Hypothyroidism   Assessment & Plan     · Patient's TSH level noted to be elevated at 16 896  · Her Synthroid dose was increased from 100 to 125 mcg daily  · She will need repeat TSH level checked in 4-6 weeks as outpatient

## 2018-10-19 NOTE — PROGRESS NOTES
Progress Note - Jefferson Jolly 1960, 66 y o  female MRN: [id number]    Unit/Bed#: [address] [place] [address] Encounter: [id number]    Primary Care Provider: Gabe Mancia MD   Date and time admitted to hospital: 10/17/2018  6:08 PM        * Alcohol withdrawal syndrome, with delirium [facility])   Assessment & Plan    · Patient admitted in DTs from alcohol withdrawal with hallucinations/delirium  · CT head with no acute findings  · UDS negative  · EtOh on admission <3  · On librium taper due to end 10/22 and CIWA protocol  · Cont  IVF, multivitamin, thiamine, folic acid  · Monitor and replete electrolytes as needed  · Initially required Nonviolent restraints, continuous 1:1 obs as she was disoriented and unable to follow commands and interrupting care  Now stable  · Ambien PRN sleep  · Cont supportive care     Thrombocytopenia [facility])   Assessment & Plan    Likely due to alcohol abuse  stable     Abnormal LFTs   Assessment & Plan    · No prior lab work available for comparison  May have alcoholic hepatitis  · Tylenol and salicylate level are unremarkable  · hepatitis panel negative  · RUQ US Echogenic liver most consistent with hepatic steatosis  · IVF hydrating, LFTs have peaked and are downtrending  · cont to monitor with serial labs  Hypothyroidism   Assessment & Plan    · Patient's TSH level noted to be elevated at 16 896  · Her Synthroid dose was increased from 100 to 125 mcg daily  · She will need repeat TSH level checked in 4-6 weeks as outpatient       Acute cystitis with hematuria-resolved as of 10/19/2018   Assessment & Plan    · Received IV rocephin, 3 days  · UA with blood, leukocytes  · Urine culture with mixed contaminants           VTE Pharmacologic Prophylaxis:   Pharmacologic: none  Mechanical VTE Prophylaxis in Place: Yes    Patient Centered Rounds: I have performed bedside rounds with nursing staff today      Discussions with Specialists or Other Care Team Provider: Yes    Education and Discussions with Family / Patient:Yes    Time Spent for Care: 30 minutes  More than 50% of total time spent on counseling and coordination of care as described above  Current Length of Stay: 3 day(s)    Current Patient Status: Inpatient     Discharge Plan: due to end librium taper on 10/22, then can DC home    Code Status: Level 1 - Full Code      Subjective:   Feels good today  Had a BM last night after given laxative  Denies shakes or anxiety this morning  Boyfriend at bedside, offering support  Objective:       Vitals:   Temp (24hrs), Av 5 °F (36 4 °C), Min:97 1 °F (36 2 °C), Max:97 9 °F (36 6 °C)    Temp:  [97 1 °F (36 2 °C)-97 9 °F (36 6 °C)] 97 9 °F (36 6 °C)  HR:  [73-85] 74  Resp:  [18] 18  BP: (102-109)/(57-71) 109/71  SpO2:  [95 %-97 %] 97 %  Body mass index is 24 21 kg/m²  Input and Output Summary (last 24 hours): Intake/Output Summary (Last 24 hours) at 10/20/18 0736  Last data filed at 10/19/18 1130   Gross per 24 hour   Intake          4633 33 ml   Output                0 ml   Net          4633 33 ml       Physical Exam:     Physical Exam   Constitutional: She is oriented to person, place, and time  She appears well-developed  No distress  HENT:   Head: Normocephalic and atraumatic  Cardiovascular: Normal rate, regular rhythm and normal heart sounds  Pulmonary/Chest: Effort normal and breath sounds normal  No respiratory distress  She has no wheezes  She has no rales  Abdominal: Soft  Bowel sounds are normal  She exhibits no distension  There is no tenderness  There is no rebound and no guarding  Musculoskeletal: She exhibits no edema, tenderness or deformity  Neurological: She is alert and oriented to person, place, and time  No tremors   Skin: Skin is warm and dry  She is not diaphoretic  Nursing note and vitals reviewed        Additional Data:     Labs:      Results from last 7 days  Lab Units 10/20/18  0518  10/17/18  1835   WBC Thousand/uL 3 22*  < > 7 24   HEMOGLOBIN g/dL 10 3*  < > 12 6   HEMATOCRIT % 32 6*  < > 38 2   PLATELETS Thousands/uL 115*  < > 110*   NEUTROS PCT %  --   --  65   LYMPHS PCT %  --   --  23   MONOS PCT %  --   --  10   EOS PCT %  --   --  1   < > = values in this interval not displayed  Results from last 7 days  Lab Units 10/19/18  0423   SODIUM mmol/L 137   POTASSIUM mmol/L 4 0   CHLORIDE mmol/L 102   CO2 mmol/L 23   BUN mg/dL 13   CREATININE mg/dL 0 82   CALCIUM mg/dL 8 4   ALK PHOS U/L 53   ALT U/L 697*   AST U/L 585*           * I Have Reviewed All Lab Data Listed Above  * Additional Pertinent Lab Tests Reviewed: All Labs For Current Hospital Admission Reviewed    Imaging:  Xr Chest 1 View Portable    Result Date: 10/18/2018  Narrative: CHEST INDICATION:   medical clearance  COMPARISON:  7/12/2017 EXAM PERFORMED/VIEWS:  XR CHEST PORTABLE Images: 1 FINDINGS: Cardiomediastinal silhouette appears unremarkable  Lungs are mildly hyperinflated but clear with no lobar consolidation, interstitial edema, or pleural effusions  No pneumothorax  Curvilinear calcification projecting over the left chest likely represents calcified breast implant  A healed fracture deformity of the right proximal humerus is stable as is suspected osteonecrosis of the head  Impression: No acute cardiopulmonary disease  Workstation performed: [id number]     Ct Head Without Contrast    Result Date: 10/17/2018  Narrative: CT BRAIN - WITHOUT CONTRAST INDICATION:   altered, hallucinations  COMPARISON:  None  TECHNIQUE:  CT examination of the brain was performed  In addition to axial images, coronal 2D reformatted images were created and submitted for interpretation  Radiation dose length product (DLP) for this visit:  1116 44 mGy-cm   This examination, like all CT scans performed in the [place], was performed utilizing techniques to minimize radiation dose exposure, including the use of iterative reconstruction and automated exposure control    IMAGE QUALITY:  Diagnostic  FINDINGS: PARENCHYMA:  No intracranial mass, mass effect or midline shift  No CT signs of acute infarction  No acute parenchymal hemorrhage  VENTRICLES AND EXTRA-AXIAL SPACES:  Normal for the patient's age  VISUALIZED ORBITS AND PARANASAL SINUSES:  Unremarkable  CALVARIUM AND EXTRACRANIAL SOFT TISSUES:  Normal      Impression: No acute intracranial abnormality  Workstation performed: [id number]     Imaging Reports Reviewed by myself    Cultures:   Blood Culture: No results found for: BLOODCX  Urine Culture:   Lab Results   Component Value Date    URINECX 30,000-39,000 cfu/ml  10/17/2018     Sputum Culture: No components found for: SPUTUMCX  Wound Culture: No results found for: WOUNDCULT    Last 24 Hours Medication List:     Current Facility-Administered Medications:  bisacodyl 5 mg Oral HS Ludin Fuller MD    chlordiazePOXIDE 25 mg Oral Q4H Ludin Fuller MD    Followed by        Harley Daugherty ON 10/21/2018] chlordiazePOXIDE 25 mg Oral Q6H 3630 Ilan Neal MD    enoxaparin 40 mg Subcutaneous Daily Keely Revankar, DO    folic acid 1 mg Oral Daily Keely Revankar, DO    influenza vaccine 0 5 mL Intramuscular Prior to discharge Nemo Loud, DO    levothyroxine 125 mcg Oral Early Morning Ludin Fuller MD    LORazepam 1 mg Intravenous Q4H PRN Keely Revankar, DO    magnesium citrate 148 mL Oral Once Ludin Fuller MD    multivitamin-minerals 1 tablet Oral Daily Keely Revankar, DO    ondansetron 4 mg Intravenous Q6H PRN Keely Revankar, DO    sodium chloride 0 9 % with KCl 20 mEq/L 125 mL/hr Intravenous Continuous Keely Revankar, DO Last Rate: 125 mL/hr (10/20/18 0519)   thiamine 100 mg Oral Daily Keely Revankar, DO    zolpidem 5 mg Oral HS PRN Ludin Fuller MD         Today, Patient Was Seen By: Ludin Fuller MD    ** Please Note: Dragon 360 Dictation voice to text software may have been used in the creation of this document   **

## 2018-10-19 NOTE — UTILIZATION REVIEW
Continued Stay Review    Date: 10/19/18    Vital Signs: /84 (BP Location: Right arm)   Pulse 60   Temp 97 9 °F (36 6 °C) (Tympanic)   Resp 18   Ht 5' 6" (1 676 m)   Wt 68 kg (150 lb)   SpO2 96%   BMI 24 21 kg/m²     1:1 OBSERVATION  CBC CMP MG PHOS  MIRALAX   Medications:   Scheduled Meds:   Current Facility-Administered Medications:  chlordiazePOXIDE 50 mg Oral Q6H 3630 Ilan Neal MD    Followed by        Kalen Keith ON 10/20/2018] chlordiazePOXIDE 25 mg Oral Q4H Gema Paiz MD    Followed by        Kalen Keith ON 10/21/2018] chlordiazePOXIDE 25 mg Oral Q6H 3630 Ilan Neal MD    enoxaparin 40 mg Subcutaneous Daily Keely Revankar, DO    folic acid 1 mg Oral Daily Keely Revankar, DO    influenza vaccine 0 5 mL Intramuscular Prior to discharge Keely Robbins, DO    levothyroxine 125 mcg Oral Early Morning Gema Paiz MD    LORazepam 1 mg Intravenous Q4H PRN Keely Revankar, DO    multivitamin-minerals 1 tablet Oral Daily Keely Revankar, DO    ondansetron 4 mg Intravenous Q6H PRN Keely Revankar, DO    pneumococcal 23-valent polysaccharide vaccine 0 5 mL Subcutaneous Prior to discharge Keelyroxann Robbins, DO    polyethylene glycol 17 g Oral Once Gema Paiz MD    sodium chloride 0 9 % with KCl 20 mEq/L 125 mL/hr Intravenous Continuous Keely Revankar, DO Last Rate: 125 mL/hr (10/18/18 2306)   thiamine 100 mg Oral Daily Keely Revankar, DO      Continuous Infusions:   sodium chloride 0 9 % with KCl 20 mEq/L 125 mL/hr Last Rate: 125 mL/hr (10/18/18 2306)     PRN Meds: influenza vaccine    LORazepam    ondansetron    pneumococcal 23-valent polysaccharide vaccine    Abnormal Labs/Diagnostic Results:   WC 3 70      Age/Sex: 66 y o  female     Assessment/Plan:   Feels very shaky and anxious  Denies abdominal pain or N/V  Has not had a BM in several days     Alcohol withdrawal syndrome, with delirium [facility])   Assessment & Plan     · Patient in DTs from alcohol withdrawal  Having hallucinations and is not oriented  · CT head with no acute findings  · UDS negative  · EtOh on admission <3  · On librium and CIWA protocol  · Cont  IVF, multivitamin, thiamine, folic acid  · Monitor and replete electrolytes as needed  · Initially required Nonviolent restraints, continuous 1:1 obs as she was disoriented and unable to follow commands and interrupting care  Now stable   Thrombocytopenia ([address] )   Assessment & Plan     Likely due to alcohol abuse  stable   Abnormal LFTs   Assessment & Plan     · No prior lab work available for comparison  May have alcoholic hepatitis  · Tylenol and salicylate level are unremarkable  · hepatitis panel negative  · RUQ US Echogenic liver most consistent with hepatic steatosis  · IVF hydrating, LFTs have peaked and are downtrending  · cont to monitor with serial labs      Hypothyroidism   Assessment & Plan     · Patient's TSH level noted to be elevated at 16 896  · Her Synthroid dose was increased from 100 to 125 mcg daily  · She will need repeat TSH level checked in 4-6 weeks as outpatient       Discharge Plan:

## 2018-10-19 NOTE — PLAN OF CARE
DISCHARGE PLANNING     Discharge to home or other facility with appropriate resources Progressing        INFECTION - ADULT     Absence or prevention of progression during hospitalization Progressing        PAIN - ADULT     Verbalizes/displays adequate comfort level or baseline comfort level Progressing        Potential for Falls     Patient will remain free of falls Progressing        Prexisting or High Potential for Compromised Skin Integrity     Skin integrity is maintained or improved Progressing        RESPIRATORY - ADULT     Achieves optimal ventilation and oxygenation Progressing

## 2018-10-19 NOTE — ED NOTES
10/19/18 @ 1409:  PES received message from Dr Kenyon Keen requesting that PES provide patient with outpatient providers for psychotherapy  PES will compile "in-network" providers and present to patient  [address], [address]: PES provided contact information for [facility] Psychiatry and Clinical psychotherapy and consultation, as well as CARES and AA information    [address], [place]

## 2018-10-19 NOTE — PROGRESS NOTES
Progress Note - [address] [facility] 66 y o  female MRN: [id number]  Unit/Bed#: [address] [address] Encounter: [id number]    Subjective:  Patient was seen and evaluated for follow up for alcohol withdrawal  She reports she has trouble with anxiety and sleep for years (sleep since childhood) and states that she has been tried on numerous anti-depressants with side effects that were intolerable  I educated her on the long term detrimental effects of benzo use as well as how they interact with alcohol  We also had a discussion about her alcohol use and the possibility of her downplaying her alcohol consumption  She states she is feeling better today, is not agitated or profoundly confused like yesterday and in oriented to time, place, person and situation  States she does not have her psych meds at home because she missed her last appointment 2 weeks ago  This klonopin withdrawal likely added to the severity of her withdrawal seen here  PDMP was checked  Last prescribed by Dr Jaya Cooper Sept 19, 2018      Behavior over the last 24 hours:  improved  Sleep: poor sleep  Appetite: normal  Medication side effects: No  ROS: no complaints    Objective:    Mental Status Evaluation:  Appearance:  age appropriate and disheveled   Behavior:  evasive, guarded and mild tremor   Speech:  normal pitch and normal volume   Mood:  normal   Affect:  normal   Language: naming objects and repeating phrases   Thought Process:  normal   Associations: intact associations   Thought Content:  normal   Perceptual Disturbances: None   Risk Potential: denies SI/HI   Sensorium:  person, place, time/date and situation   Memory:  recent and remote memory grossly intact   Cognition:  grossly intact   Consciousness:  alert and awake    Attention: attention span and concentration were age appropriate   Intellect: within normal limits   Fund of Knowledge: awareness of current events: yes   Insight:  limited - some downplaying for alcohol/benzo dependency    Judgment: fair   Muscle Strength and Tone: not assessed   Gait/Station: not assessed   Motor Activity: abnormal movement noted  tremor mild         Assessment  Diana Latham is a 66 y o  female  Follow up for alcohol withdrawal   Diagnosis:Alcohol Use Disorder - severe, Sedative Use Disorder-severe, Unspecified anxiety disorder      Recommended Treatment:   1  Will give Ambien in hospital for sleep  2  Will defer to primary attending regarding klonopin/ambien prescriptions on discharge  3  Continue CIWA protocol and complete Librium taper  4  Recommended to patient therapy for anxiety and sleep as pharmacological options have been exhausted        Medications: all current active meds have been reviewed      Risks, benefits and possible side effects of Medications:     Risks, benefits, and possible side effects of medications explained to patient and patient verbalizes understanding  Labs: I have personally reviewed all pertinent laboratory results  QTc: 438 (7/14/2018)    Counseling / Coordination of Care  Total floor / unit time spent today 30 minutes  Greater than 50% of total time was spent with the patient and / or family counseling and / or coordination of care   A description of the counseling / coordination of care: education/counseling of patient, coordination of care with primary team    Rudy Mena MD

## 2018-10-20 LAB
ALBUMIN SERPL BCP-MCNC: 3.5 G/DL (ref 3.5–5)
ALP SERPL-CCNC: 52 U/L (ref 46–116)
ALT SERPL W P-5'-P-CCNC: 441 U/L (ref 12–78)
ANION GAP SERPL CALCULATED.3IONS-SCNC: 10 MMOL/L (ref 4–13)
AST SERPL W P-5'-P-CCNC: 223 U/L (ref 5–45)
BILIRUB SERPL-MCNC: 0.4 MG/DL (ref 0.2–1)
BUN SERPL-MCNC: 14 MG/DL (ref 5–25)
CALCIUM SERPL-MCNC: 8.5 MG/DL (ref 8.3–10.1)
CHLORIDE SERPL-SCNC: 105 MMOL/L (ref 100–108)
CO2 SERPL-SCNC: 24 MMOL/L (ref 21–32)
CREAT SERPL-MCNC: 0.94 MG/DL (ref 0.6–1.3)
ERYTHROCYTE [DISTWIDTH] IN BLOOD BY AUTOMATED COUNT: 11.7 % (ref 11.6–15.1)
GFR SERPL CREATININE-BSD FRML MDRD: 68 ML/MIN/1.73SQ M
GLUCOSE SERPL-MCNC: 100 MG/DL (ref 65–140)
HCT VFR BLD AUTO: 32.6 % (ref 34.8–46.1)
HGB BLD-MCNC: 10.3 G/DL (ref 11.5–15.4)
MAGNESIUM SERPL-MCNC: 1.3 MG/DL (ref 1.6–2.6)
MCH RBC QN AUTO: 33.7 PG (ref 26.8–34.3)
MCHC RBC AUTO-ENTMCNC: 31.6 G/DL (ref 31.4–37.4)
MCV RBC AUTO: 107 FL (ref 82–98)
PHOSPHATE SERPL-MCNC: 3.2 MG/DL (ref 2.7–4.5)
PLATELET # BLD AUTO: 115 THOUSANDS/UL (ref 149–390)
PMV BLD AUTO: 11.7 FL (ref 8.9–12.7)
POTASSIUM SERPL-SCNC: 4.2 MMOL/L (ref 3.5–5.3)
PROT SERPL-MCNC: 6.9 G/DL (ref 6.4–8.2)
RBC # BLD AUTO: 3.06 MILLION/UL (ref 3.81–5.12)
SODIUM SERPL-SCNC: 139 MMOL/L (ref 136–145)
WBC # BLD AUTO: 3.22 THOUSAND/UL (ref 4.31–10.16)

## 2018-10-20 PROCEDURE — 90682 RIV4 VACC RECOMBINANT DNA IM: CPT | Performed by: FAMILY MEDICINE

## 2018-10-20 PROCEDURE — 84100 ASSAY OF PHOSPHORUS: CPT | Performed by: STUDENT IN AN ORGANIZED HEALTH CARE EDUCATION/TRAINING PROGRAM

## 2018-10-20 PROCEDURE — 99232 SBSQ HOSP IP/OBS MODERATE 35: CPT | Performed by: STUDENT IN AN ORGANIZED HEALTH CARE EDUCATION/TRAINING PROGRAM

## 2018-10-20 PROCEDURE — 94760 N-INVAS EAR/PLS OXIMETRY 1: CPT

## 2018-10-20 PROCEDURE — 80053 COMPREHEN METABOLIC PANEL: CPT | Performed by: STUDENT IN AN ORGANIZED HEALTH CARE EDUCATION/TRAINING PROGRAM

## 2018-10-20 PROCEDURE — 85027 COMPLETE CBC AUTOMATED: CPT | Performed by: STUDENT IN AN ORGANIZED HEALTH CARE EDUCATION/TRAINING PROGRAM

## 2018-10-20 PROCEDURE — 83735 ASSAY OF MAGNESIUM: CPT | Performed by: STUDENT IN AN ORGANIZED HEALTH CARE EDUCATION/TRAINING PROGRAM

## 2018-10-20 RX ORDER — MAGNESIUM SULFATE HEPTAHYDRATE 40 MG/ML
4 INJECTION, SOLUTION INTRAVENOUS ONCE
Status: COMPLETED | OUTPATIENT
Start: 2018-10-20 | End: 2018-10-20

## 2018-10-20 RX ORDER — CHLORDIAZEPOXIDE HYDROCHLORIDE 25 MG/1
25 CAPSULE, GELATIN COATED ORAL EVERY 6 HOURS SCHEDULED
Status: COMPLETED | OUTPATIENT
Start: 2018-10-20 | End: 2018-10-21

## 2018-10-20 RX ADMIN — MAGNESIUM SULFATE HEPTAHYDRATE 4 G: 40 INJECTION, SOLUTION INTRAVENOUS at 09:54

## 2018-10-20 RX ADMIN — CHLORDIAZEPOXIDE HYDROCHLORIDE 25 MG: 25 CAPSULE ORAL at 05:12

## 2018-10-20 RX ADMIN — ENOXAPARIN SODIUM 40 MG: 40 INJECTION SUBCUTANEOUS at 09:49

## 2018-10-20 RX ADMIN — Medication 100 MG: at 09:53

## 2018-10-20 RX ADMIN — CHLORDIAZEPOXIDE HYDROCHLORIDE 25 MG: 25 CAPSULE ORAL at 09:49

## 2018-10-20 RX ADMIN — Medication 1 TABLET: at 09:49

## 2018-10-20 RX ADMIN — LEVOTHYROXINE SODIUM 125 MCG: 125 TABLET ORAL at 05:19

## 2018-10-20 RX ADMIN — CHLORDIAZEPOXIDE HYDROCHLORIDE 25 MG: 25 CAPSULE ORAL at 20:11

## 2018-10-20 RX ADMIN — SODIUM CHLORIDE AND POTASSIUM CHLORIDE 125 ML/HR: .9; .15 SOLUTION INTRAVENOUS at 22:43

## 2018-10-20 RX ADMIN — SODIUM CHLORIDE AND POTASSIUM CHLORIDE 125 ML/HR: .9; .15 SOLUTION INTRAVENOUS at 15:11

## 2018-10-20 RX ADMIN — INFLUENZA A VIRUS A/MICHIGAN/45/2015 (H1N1) RECOMBINANT HEMAGGLUTININ ANTIGEN, INFLUENZA A VIRUS A/SINGAPORE/INFIMH-16-0019/2016 (H3N2) RECOMBINANT HEMAGGLUTININ ANTIGEN, INFLUENZA B VIRUS B/MARYLAND/15/2016 RECOMBINANT HEMAGGLUTININ ANTIGEN, AND INFLUENZA B VIRUS B/PHUKET/3073/2013 RECOMBINANT HEMAGGLUTININ ANTIGEN 0.5 ML: 45; 45; 45; 45 INJECTION INTRAMUSCULAR at 15:13

## 2018-10-20 RX ADMIN — FOLIC ACID 1 MG: 1 TABLET ORAL at 09:49

## 2018-10-20 RX ADMIN — CHLORDIAZEPOXIDE HYDROCHLORIDE 25 MG: 25 CAPSULE ORAL at 15:00

## 2018-10-20 RX ADMIN — SODIUM CHLORIDE AND POTASSIUM CHLORIDE 125 ML/HR: .9; .15 SOLUTION INTRAVENOUS at 05:19

## 2018-10-20 RX ADMIN — ZOLPIDEM TARTRATE 5 MG: 5 TABLET, FILM COATED ORAL at 21:41

## 2018-10-20 NOTE — DISCHARGE INSTRUCTIONS
-Your thyroid medication dose was changed  You will need to follow up with your primary care doctor to monitor your thyroid on the new dose  You will need to get blood work done in 4-6 weeks for this  -your liver has been affected by alcohol and you need to get blood work done to monitor your liver after dischare  You can follow up the results with your primary care doctor  Abuse of Alcohol   WHAT YOU NEED TO KNOW:   · Alcohol abuse is unhealthy drinking behavior  You may drink too much at one time once a week, or continue to drink too much daily  You continue to drink even though it causes problems  The problems can be alcohol related legal problems, or problems with work or relationships with family  · If you drink too much at one time, you are binge drinking  Binge drinking is when you have a large amount of alcohol in a short time  Your blood alcohol concentrations (AVANI) goes above 0 08 g/dLlevel during binge drinking  For men, this usually happens with more than 4 drinks in 2 hours  For women, it is more than 3 drinks in 2 hours  A drink is 12 ounces of beer, 4 ounces of wine, or 1½ ounces of liquor  DISCHARGE INSTRUCTIONS:   Call 911 for the following:   · You have sudden chest pain or trouble breathing  · You have a seizure or have shaking or trembling  · You were in an accident because of alcohol  Seek care immediately if:   · You want to harm yourself or others  · You have hallucinations (you see or hear things that are not real)  · You cannot stop vomiting or you vomit blood  Contact your healthcare provider if:   · You need help to stop drinking alcohol  · You have questions or concerns about your condition or care  Medicines:   · Vitamin supplements  may be given to treat low vitamin levels  Alcohol can make it hard for your body to absorb enough vitamins such as B1  Vitamin supplements may also be given to prevent alcohol related brain damage       · Take your medicine as directed  Contact your healthcare provider if you think your medicine is not helping or if you have side effects  Tell him or her if you are allergic to any medicine  Keep a list of the medicines, vitamins, and herbs you take  Include the amounts, and when and why you take them  Bring the list or the pill bottles to follow-up visits  Carry your medicine list with you in case of an emergency  Treatments or therapies you may need:   · Detoxification (detox) and withdrawal  is a program that helps you to safely get alcohol out of your body  Detox can also help get rid of the physical need to drink  Healthcare providers monitor the physical symptoms of withdrawal  They may give you medicines to help decrease nausea, dehydration, and seizures  Healthcare providers will also monitor your blood pressure, heart and breathing rates, and your temperature  Symptoms of anxiety, depression, and suicidal thoughts are also monitored and managed during detox  Healthcare providers may give you medicines for these symptoms and therapy sessions will be available to you  Detox is usually done at a detox center or in a hospital  Healthcare providers do not recommend that you try to detox at home or by yourself  Withdrawal symptoms may become life-threatening  The center can help you find 12 step programs or an individual therapist to help with emotional support after detox  · Inpatient and outpatient treatment  focus on your personal needs to help you stop drinking  Treatment helps you understand the reasons you abuse alcohol  Counselors and therapists provide you with support and help you find ways to cope instead of drinking  You may need inpatient treatment to provide a controlled environment  You may need outpatient treatment after your inpatient treatment is complete  · Alcohol aversion therapy  takes away the desire to drink by causing a negative reaction when you drink   Healthcare providers may give you medicines that cause nausea and vomiting when you drink alcohol  They may instead give you a medicine that decreases your urge to drink alcohol  These medicines are used to help you stop drinking or reduce the amount you drink  They can also help you avoid relapse  Follow up with your healthcare provider as directed:  Write down your questions so you remember to ask them during your visits  Avoid alcohol:  You should stop drinking entirely  Alcohol can damage your brain, heart, and liver  It also increases your risk for injury, high blood pressure, and certain types of cancer  Alcohol is dangerous when you combine it with certain medicines  Do not drive if you drink alcohol:  Make sure someone who has not been drinking can help you get home  Get support:  Most people need support to stop drinking alcohol  Mental health providers, support groups, rehabilitation centers, and your healthcare provider can provide support  For more information:   · Alcoholics Anonymous  Web Address: http://[employer]/  · Substance Abuse and [address] , [address] [place]  Web Address: https://[employer]/  © 2017 [address] Information is for End User's use only and may not be sold, redistributed or otherwise used for commercial purposes  All illustrations and images included in CareNotes® are the copyrighted property of [employer]  or [employer]  The above information is an  only  It is not intended as medical advice for individual conditions or treatments  Talk to your doctor, nurse or pharmacist before following any medical regimen to see if it is safe and effective for you
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
## 2018-10-21 VITALS
BODY MASS INDEX: 24.11 KG/M2 | WEIGHT: 150 LBS | HEIGHT: 66 IN | SYSTOLIC BLOOD PRESSURE: 125 MMHG | TEMPERATURE: 98.5 F | HEART RATE: 77 BPM | RESPIRATION RATE: 18 BRPM | OXYGEN SATURATION: 96 % | DIASTOLIC BLOOD PRESSURE: 65 MMHG

## 2018-10-21 PROBLEM — R44.3 HALLUCINATION: Status: ACTIVE | Noted: 2018-10-21

## 2018-10-21 PROBLEM — R44.3 HALLUCINATION: Status: RESOLVED | Noted: 2018-10-21 | Resolved: 2018-10-21

## 2018-10-21 PROBLEM — N39.0 UTI (URINARY TRACT INFECTION): Status: RESOLVED | Noted: 2018-10-21 | Resolved: 2018-10-21

## 2018-10-21 PROBLEM — N39.0 UTI (URINARY TRACT INFECTION): Status: ACTIVE | Noted: 2018-10-21

## 2018-10-21 PROBLEM — F10.231 ALCOHOL WITHDRAWAL SYNDROME, WITH DELIRIUM (HCC): Status: RESOLVED | Noted: 2018-10-17 | Resolved: 2018-10-21

## 2018-10-21 PROBLEM — F99 PSYCHIATRIC DISTURBANCE: Status: RESOLVED | Noted: 2018-10-17 | Resolved: 2018-10-21

## 2018-10-21 LAB
ALBUMIN SERPL BCP-MCNC: 3.1 G/DL (ref 3.5–5)
ALP SERPL-CCNC: 49 U/L (ref 46–116)
ALT SERPL W P-5'-P-CCNC: 320 U/L (ref 12–78)
ANION GAP SERPL CALCULATED.3IONS-SCNC: 9 MMOL/L (ref 4–13)
AST SERPL W P-5'-P-CCNC: 114 U/L (ref 5–45)
BILIRUB SERPL-MCNC: 0.3 MG/DL (ref 0.2–1)
BUN SERPL-MCNC: 12 MG/DL (ref 5–25)
CALCIUM SERPL-MCNC: 8.3 MG/DL (ref 8.3–10.1)
CHLORIDE SERPL-SCNC: 106 MMOL/L (ref 100–108)
CO2 SERPL-SCNC: 24 MMOL/L (ref 21–32)
CREAT SERPL-MCNC: 0.79 MG/DL (ref 0.6–1.3)
ERYTHROCYTE [DISTWIDTH] IN BLOOD BY AUTOMATED COUNT: 11.8 % (ref 11.6–15.1)
GFR SERPL CREATININE-BSD FRML MDRD: 84 ML/MIN/1.73SQ M
GLUCOSE SERPL-MCNC: 106 MG/DL (ref 65–140)
HCT VFR BLD AUTO: 35.6 % (ref 34.8–46.1)
HGB BLD-MCNC: 11.4 G/DL (ref 11.5–15.4)
MAGNESIUM SERPL-MCNC: 1.7 MG/DL (ref 1.6–2.6)
MCH RBC QN AUTO: 32.9 PG (ref 26.8–34.3)
MCHC RBC AUTO-ENTMCNC: 32 G/DL (ref 31.4–37.4)
MCV RBC AUTO: 103 FL (ref 82–98)
PLATELET # BLD AUTO: 150 THOUSANDS/UL (ref 149–390)
PMV BLD AUTO: 12.1 FL (ref 8.9–12.7)
POTASSIUM SERPL-SCNC: 4.2 MMOL/L (ref 3.5–5.3)
PROT SERPL-MCNC: 6.5 G/DL (ref 6.4–8.2)
RBC # BLD AUTO: 3.47 MILLION/UL (ref 3.81–5.12)
SODIUM SERPL-SCNC: 139 MMOL/L (ref 136–145)
WBC # BLD AUTO: 3.97 THOUSAND/UL (ref 4.31–10.16)

## 2018-10-21 PROCEDURE — 85027 COMPLETE CBC AUTOMATED: CPT | Performed by: STUDENT IN AN ORGANIZED HEALTH CARE EDUCATION/TRAINING PROGRAM

## 2018-10-21 PROCEDURE — 99239 HOSP IP/OBS DSCHRG MGMT >30: CPT | Performed by: STUDENT IN AN ORGANIZED HEALTH CARE EDUCATION/TRAINING PROGRAM

## 2018-10-21 PROCEDURE — 80053 COMPREHEN METABOLIC PANEL: CPT | Performed by: STUDENT IN AN ORGANIZED HEALTH CARE EDUCATION/TRAINING PROGRAM

## 2018-10-21 PROCEDURE — 83735 ASSAY OF MAGNESIUM: CPT | Performed by: STUDENT IN AN ORGANIZED HEALTH CARE EDUCATION/TRAINING PROGRAM

## 2018-10-21 RX ORDER — LEVOTHYROXINE SODIUM 0.12 MG/1
125 TABLET ORAL DAILY
Qty: 30 TABLET | Refills: 0 | Status: SHIPPED | OUTPATIENT
Start: 2018-10-21

## 2018-10-21 RX ORDER — LANOLIN ALCOHOL/MO/W.PET/CERES
100 CREAM (GRAM) TOPICAL DAILY
Qty: 30 TABLET | Refills: 0 | Status: SHIPPED | OUTPATIENT
Start: 2018-10-21

## 2018-10-21 RX ORDER — FOLIC ACID 1 MG/1
1 TABLET ORAL DAILY
Qty: 30 TABLET | Refills: 0 | Status: SHIPPED | OUTPATIENT
Start: 2018-10-21

## 2018-10-21 RX ADMIN — CHLORDIAZEPOXIDE HYDROCHLORIDE 25 MG: 25 CAPSULE ORAL at 11:58

## 2018-10-21 RX ADMIN — LEVOTHYROXINE SODIUM 125 MCG: 125 TABLET ORAL at 05:19

## 2018-10-21 RX ADMIN — CHLORDIAZEPOXIDE HYDROCHLORIDE 25 MG: 25 CAPSULE ORAL at 05:19

## 2018-10-21 RX ADMIN — Medication 100 MG: at 08:07

## 2018-10-21 RX ADMIN — CHLORDIAZEPOXIDE HYDROCHLORIDE 25 MG: 25 CAPSULE ORAL at 17:43

## 2018-10-21 RX ADMIN — Medication 1 TABLET: at 08:07

## 2018-10-21 RX ADMIN — FOLIC ACID 1 MG: 1 TABLET ORAL at 08:08

## 2018-10-21 RX ADMIN — CHLORDIAZEPOXIDE HYDROCHLORIDE 25 MG: 25 CAPSULE ORAL at 00:36

## 2018-10-21 NOTE — PLAN OF CARE
DISCHARGE PLANNING     Discharge to home or other facility with appropriate resources Adequate for Discharge        DISCHARGE PLANNING - CARE MANAGEMENT     Discharge to post-acute care or home with appropriate resources Adequate for Discharge        INFECTION - ADULT     Absence or prevention of progression during hospitalization Adequate for Discharge        Nutrition/Hydration-ADULT     Nutrient/Hydration intake appropriate for improving, restoring or maintaining nutritional needs Adequate for Discharge        Potential for Falls     Patient will remain free of falls Adequate for Discharge        Prexisting or High Potential for Compromised Skin Integrity     Skin integrity is maintained or improved Adequate for Discharge

## 2018-10-21 NOTE — NURSING NOTE
Pt left via walking with steady gait accompanied by significant other  Discharge instructions provided  Non-smoker  Up to date with immunizations  IV dc and intact  No further questions at this time

## 2018-10-22 NOTE — UTILIZATION REVIEW
Auth:    [id number]    Notification of Discharge  This is a Notification of Discharge from our facility [address]  Please be advised that this patient has been discharge from our facility  Below you will find the admission and discharge date and time including the patients disposition  PRESENTATION DATE: 10/17/2018  6:08 PM  IP ADMISSION DATE: 10/17/18 2149  DISCHARGE DATE: 10/21/2018  6:10 PM  DISPOSITION: Home/Self Care    [address] [facility] in the [facility] by [facility] Utilization Review Department  Phone: [phone]; Fax [phone]  ATTENTION: The Network Utilization Review Department is now centralized for our 9 Facilities  Make a note that we have a new phone and fax numbers for our Department  Please call with any questions or concerns to [phone] and carefully follow the prompts so that you are directed to the right person  All voicemails are confidential  Fax any determinations, approvals, denials, and requests for initial or continue stay review clinical to [phone]  Due to HIGH CALL volume, it would be easier if you could please send faxed requests to expedite your requests and in part, help us provide discharge notifications faster